# Patient Record
(demographics unavailable — no encounter records)

---

## 2025-05-27 NOTE — ASSESSMENT
[FreeTextEntry1] : Patient came in for Initial liver transplant evaluation. Pt met with members of the team: Transplant Surgeon , Hepatologist Dr Adams,Transplant Coordinator, , Dietician, Pharmacist,  and ASA. Liver Transplant Education provided via Power Point presentation, overview given of transplant evaluation process, risks/benefits of transplantation, live donor/ donor transplantation,PHS risk/DCD/HCV organ sources, Waitlist management, post transplant care/medication/clinic follow-up. Provided and reviewed educational packet.  All questions answered and team contact information provided. Medications reviewed and updated. Patient reviewed and signed: Informed consent for liver transplant evaluation, HIPPA, [employer], email and HIV form. Patient was advised to inform the transplant coordinator with any changes in health status.   HCV donor acceptance consent - Deferred at this time   Required testing/labs reviewed with pt.  Once said testing has been completed, Transplant Coordinator will present to the selection committee   Assessment and Plan:  Pt is a 69 yo F w AIH cirrhosis c/b pruritis (no jaundice appreciated), recently restarted (25) on Pred 30, MMF 1000mg bid after being off treatment for over a year, comorbid w DM, HNT, fibromyalgia, Sjogren's syn, SLE, hypothyroidism and carcinoid tumor s/p gastric wedge resection here for liver txp evaluation.  Pt endorses Pruritis over whole body for past 2 mos.  Non jaundiced. TB 1.4. Also endorses noticing blood on TP and Toilet bowl with BM (per pt, thought not to be related to hemorrhoids).  Pt cried when starting liver txp power point, said she feels overwhelmed.  Reassured pt this is for educational purposes, not for definitive transplant, she will be evaluated today only.  Pt consented to liver txp evaluation.  The following testing is needed:   Liver Evaluation Labs, including AI markers HCC screening, CTAP 25 (pt brought CD), no HCC.  DUE NOV.  Will get MRI given CT not triple phase Never smoker Cardiac, Risk Factors, Age, DM, HTN, will need DSE, CTCA v CCath, refer to cards, prefer to see Dr. Andre EGD,  - obtain report, repeat to be done by Dr. Pérez next month (reported hx blood in TB and TP) COLON, , obtain report, repeat to be done by Dr. Pérez next month (reported hx blood in TB and TP) Infectious Disease consult, refer to ID GYN - PAP/MAMMO, PAP 2024, has MAMMO referral fr her GYN, Dr Levy, [phone], needs updated PAP HEME?/Other, hemoncology notes, b12 deficiency SW recommendations RD recommendations Dental, not needed Donor option, none at this time

## 2025-05-27 NOTE — HISTORY OF PRESENT ILLNESS
[Autoimmune Hepatitis] : Autoimmune Hepatitis [Diabetes] : History of diabetes [Hypertension] : History of hypertension [Not Working] : Not working [90: Able to carry normal activity; minor signs or symptoms of disease.] : 90: Able to carry normal activity; minor signs or symptoms of disease.  [History of HCC] : No history of hepatocellular carcinoma [Previous Transplant] : No history of previous transplant [Previous Pancreas Islet Infusion] : No history of previous pancreas islet infusion [Coronary Artery Disease] : No history of coronary artery disease [Previous MI] : No history of previous MI [FreeTextEntry3] : retired 8 yrs ago,  at [employer] [TextBox_42] : Referred to Dr. Bear by Dr Pérez Referred for OLTx evaluation by Dr. Bear (given high risk features on liver pathology) PCP Dr. Amita Mohamud [phone]; [phone] Cardiologist Dr. Andre, [phone] Hemeoncologist Dr. Head   68F w PMHx HTN, DM, obesity (BMI 30.7), fibromyalgia, Sjogren's syndrome (not on treatment), SLE, hypothyroidism, b12 deficiency (rec'd b12 shots around 15 daily shots ), carcinoid tumor s/p gastric wedge resection (25 yrs ago) and biopsy-proven Cirrhosis likely d/t pt's longstanding Autoimmune Hepatitis (MARCOS +ASMA -), recently restarted on MMF and prednisone, referred by Dr. Bear and here for liver transplant evaluation. 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 Pérez- now off treatment at least 3-4 years d/t SE   Pt stopped AIH meds (prednisone) more than 1 yr ago d/t difficulty controlling BS and HTN. The AZA was stopped due to elevated liver enzymes.  Pt was not transitioned to any other immunosuppression Diagnosed with DM and Hypothyroidism 8 yrs ago per PCP, discovered after checkup for eye blurriness, frequent urination, started on Insulin/Metformin for DM and levothyroxine for hypothyroidism 8 yrs ago will be going for Endocrinologist consult this week, Dr. Antonio Car, [phone], referral from Dr. Bear Consult with Dr. Bear 25 at [facility], and was started on prednisone 30mg PO daily with MMF 1000mg BID, also SS Bactrim for PCP ppx Was also started on pantoprazole 40mg PO daily for stress ulcer prophylaxis and advised to take separately from thyroid medication Pt was referred to Endocrinology to establish care given hx elev bs on pred  SURG HX Gastric wedge resection, laparoscopic 25 yrs ago, Dr. Heath Hysteroscopy w dilation and curettage of uterus and surgical removal of polyp or leiomyoma 2 yrs ago Cataract surgery -R 12/10/24 and  L 25,  Dr. Yeh  SOCIAL HX TOBACCO -never ETOH - occasional wine, social, not for past month DRUG/ILLICITS - denies  OCCUPATION,- retired 8 yrs ago,  at [employer] , 1 son 37, son lives [place], has a 5 yr old daughter Lives in private house in [facility] COVID VAX -2 COVID Infection, 3 yrs ago  FHX No known liver cancer, liver cirrhosis in family Paternal Uncle, colon cancer, surgically removed, alive Paternal Aunts x 2 with Autoimmune Hepatitis, one alive, one  Father, DM, kidney disease, never on dialysis, thyroid cancer,  at age 93  Father, Paternal GM, 4 paternal cousins and their mothers with Thyroid cancer paternal aunts/uncles, cousins with DM  ALLERGIES Penicillin (Rash-itch) Erythromycin (Rash-itch) Citrus (Rash-itch) Chocolate (Rash-itch)  MEDICATION bactrim SS daily pantoprazole 40mg daily prednisone 10mg daily MMF 1000mg bid Avalide (irbesartan Hctz) 300 mg-12.5 mg tab daily diclofenac 1% topical gel: Apply topically to affected area 2 times a day x 30 days (fibromyalgia) ferosol 1x daily lantus  Pulmicort Flexhaler 90 mcg/inh inhalation powder: puff(s) inhaled, prn allergies levocetirizine for allergies prn  PT SELF-STOPPED THE BELOW MEDS: MetFORMIN 500mg bid (stopped a month ago by pt, was getting dizzy spills, nausea) metoprolol succinate 25 mg daily, stopped about 1 month ago sec to rapid HR)  LABS 2025 IGG 4089 Hg 14.0  ALT 56 AST 79  TB 1.4    SCr 0.9 Na 135 ALB 3.2  A1C 6.1  TPMT awaited **  STUDIES  CTAP w/wo IVC 25 FINDINGS Liver: Nodular contour compatible with cirrhosis. The liver parenchyma appears heterogeneous. No enhancing mass is demonstrated. An 8 mm hypodensity is noted in the left lobe, image 6:27 not present on the prior study and too small to categorize. A 5 mm hypodensity is unchanged possibly a cyst. Gallbladder: Cholelithiasis. Bile ducts: Unremarkable Kidneys/Ureters/Urinary Bladder: No evidence of hydronephrosis. The bladder is unremarkable. Spleen: Unremarkable although mildly prominent. Pancreas: Unremarkable Adrenal glands: Unremarkable Retroperitoneum: Small retroperitoneal lymph nodes are unchanged. Lymph nodes: No enlarged lymph nodes. Peritoneum: No ascites Bowel/Stomach :The bowel including the stomach is not optimally distended for complete evaluation however no obstruction is demonstrated. No dilated loops of bowel are demonstrated. Surgical sutures in the stomach is unchanged. Appendix: Not well visualized Pelvic Organs: The uterus is leiomyomatous. No pelvic fluid is seen. Musculoskeletal: Normal. Vascular Structures: No evidence of abdominal aortic aneurysm. Atherosclerotic aorta.  IMPRESSION: Very small hepatic hypodensity not seen on the prior study for which short-term follow-up is recommended with CT or MR abdomen only, IV contrast in approximately 3 months. Cirrhotic morphology. No additional significant interval changes demonstrated in comparison with 4/15/2024. Additional findings as above.  Abdl US  24 Hepatomegaly with heterogeneous echotexture and nodular contour compatible with cirrhosis.  EGD , reportedly normal Dr. Pérez to repeat in about a month (wait after liver bx)  COLONOSCOPY , reportedly benign polyps  Liver biopsy 2025 Comment The biopsy specimen shows distortion of underlying hepatic architecture by broad bands of [id number], nodular fibrosis consistent with cirrhosis (trichrome, reticulin). The portal tracts show a mild chronic inflammatory infiltrate. The lobules also show mild chronic inflammation with areas of spotty necrosis and scattered acidophil bodies. The lobules also contain mild steatosis but no evidence of steatohepatitis. There is no cholestasis or stainable hepatocellular iron.  The overall findings are those of cirrhosis, likely secondary to the patient's known longstanding autoimmune hepatitis.  INTERVAL HX feels normal, a little stressed has not slept well in 3 weeks from ? anxiety denies abdl pain, NVD, f/c endorses exertional SOB, CP endorses pruritis all over body since past 3 months, reports rashes on upr chest, bilat underarms 2 mos, more since 3 weeks ago when started meds appetite fair BM 2x/day, + hemorrhoidal blood, and 1x not r/t hemorrhoids on TP and in toilet (2 weeks ago) does not exercise pt said she slipped and fell in bathtub 1 yr ago, work up in OSH negative per pt.

## 2025-05-27 NOTE — HISTORY OF PRESENT ILLNESS
[TextEntry] : GAEL BERNAL is a 68 year old female with PMH biopsy-proven AIH cirrhosis, medication nonadherence, limited health literacy, class I obesity (BMI 30.7), HTN, DM2 (A1c 6.1% in 5/2025), SLE (not on medication), RA (not on medication), Sjogren's syndrome, fibromyalgia, gout, hypothyroidism (not on medication), who presents for OLT evaluation. Referred by Dr. Abhijit Bear from [facility]. Blood type: ? MELD 3.0 = *** (based on labs from ***) PCP: Amita Mohamud Hepatologist: Dr. Abhijit Bear ([facility]) GI: Dr. Ellie Pérez ([facility]) Endo: Antonio Car [phone] ([facility]) Rheum: BECK FISCHER MD ([facility]) Pharmacy: ***   PRIOR DATA: -4/16/25: WBC 5.7, Hgb 13.2 (MCV 95.7), Plt 115. AST/ALT 75/54, DB/TB 0.3/0.8, , Alb 3.2, TP 9.2. INR 1.1.  aFP 7.4. AMA pending, ASMA pending, LKM antibody negative, IgG quant 3644 (high), IgM quant 467 (high). Serum Fe 106, ferritin 154.7.  HBsAg pending, HBsAb negative. HCV PCR ND. -5/2/25: WBC 5.3, Hgb 14.0 (MCV 95.4), Plt 120. Na 135, K 3.6, Cr 0.9, glucose 163. Mg , Phos . AST/ALT 79/56, TB 1.4, , Alb 3.2, TP 9.3.  .  INR not done.  IgG quant 4089 (high). A1c 6.1%.  TPMT pending. CT A/P w/wo IV contrast ([facility]) => nodular liver contour compatible with cirrhosis.  The liver parenchyma appears heterogeneous.  No enhancing mass is demonstrated.  An 8 mm hypodensity is noted in the left lobe, not present on the prior study (4/15/2024) and too small to categorize.  A 5 mm hypodensity is unchanged possibly a cyst.  Cholelithiasis.  No biliary ductal dilatation.  Mildly prominent spleen.  Pancreas unremarkable.  Small retroperitoneal lymph nodes are unchanged, no enlarged lymph nodes.  No ascites.  The uterus is leiomyomatous. IMPRESSION: Very small hepatic hypodensity mass seen on the prior study for which short-term follow-up is recommended with CT or MR abdomen only, IV contrast in approximately 3 months.  Cirrhotic morphology. No additional significant interval changes demonstrated in comparison with 4/15/2024.  Additional findings as above. -5/7/2025: Seen by Dr. Bear (hepatology) at [facility].  Started on prednisone 30 mg daily, MMF 1000 mg twice daily, Bactrim SS daily for PCP prophylaxis, pantoprazole 40 mg daily for GI prophylaxis.  Patient was also referred to endocrinology for management of DM2.  Today 5/26/2025: Liver meds: Prednisone 30 mg daily, MMF 1000 mg twice daily.  Bactrim SS daily (PCP prophylaxis), pantoprazole 40 mg daily (GI prophylaxis). Other meds: Irbesartan-Hydrochlorothiazide 300 mg-12.5 mg daily.  Insulin Lantus.  Diclofenac gel.  Levocetirizine as needed for allergies.  Budesonide inhaler as needed for allergies.  Ferrous sulfate 325 mg daily. STOPPED: metoprolol succinate 25 mg daily (self DC'd in 4/2025 2/2 tachycardia).  Metformin 500 mg twice daily (self DC'd in 4/2025 2/2 dizziness, nausea). Pt reports she has NEVER taken any thyroid med   Patient came alone to today's OLT evaluation appointment. Denies f/c, cp, sob, cough, abd pain, n/v/d/c, hematochezia/melena, dysuria/hematuria.   Liver Hx: Age of diagnosis: diagnosed with AIH 15 yrs ago at employee health testing. Prior to 4/2025, she had never liver biopsy previously. [facility]"" dx pt with AIH 21 years ago (2004) based on blood tests alone. There was no liver bx. Pt reports that she took AZA in 2020, took it for 1 week, pt self-dc'ed the med, "I just didn't want to take it, I don't even remember the side effect I was getting."  Previously was on prednisone via her rheumatologist (following for past 10 years -- Dr. Fischer, [facility]) in 2019 for SLE, took it for 1-2 weeks, side effects of palpitations, hyperglycemia, so pt self-dc'ed the med. Dr. Fischer knew pt self-dc'ed the prednisone. She also recalls being on prednisone via Dr. Pérez ~10 years ago. She does not recall ever taking Plaquenil. Pt reports she has not taken anything for SLE since 2019. SLE sx: diffuse rash, neck LAD. Last saw Dr. Fischer in 3/2025, follows up q3mo. During flares, she gets cortisone shot in the joints.  Prior liver biopsy:  - 4/22/2025: Liver biopsy ([facility]) => biopsy specimen shows distortion of underlying hepatic architecture by broad bands of portal to portal, nodular fibrosis consistent with cirrhosis (trichrome, reticulin).  The portal tracts show a mild chronic inflammatory infiltrate.  The lobules also show mild chronic inflammation with areas of spotty necrosis and scattered acidophil bodies.  The lobules also contain mild steatosis but no evidence of steatohepatitis.  There is no elevated or stainable hepatocellular iron. The overall findings are those of cirrhosis, likely secondary to the patient's known longstanding autoimmune hepatitis.  Prior HE: never Prior GI bleed: Endorses prior episode of in Jan 2025 (hematochezia). Was recommended COL. However, due to liver bx in 4/2025, Dr. Pérez wanted to wait 1 month. No further episodes since then. NO episodes of melena, hematemesis. Weight hx: Peak lifetime weight 186# in 2020. Was thin as a child, thin as a teen.   SOCIAL HX -Tobacco: never -Alcohol: Social - 2 glasses of wine/week; last drank in 3/2025, stopped due to her liver disease. Denies hx of heavy alcohol use. -Illicit drugs: never.  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  -Education: associate's degree in college, degree is in SW -Born in: [place]; ancestry is [place] -Lives with . Son - 36yo, lives in [place]. -Support: Unclear at this time.  Patient reports that her  has had MI x 3.  Patient also states that her son has a daughter and may not be able to assist her posttransplant.   SURGICAL HX Uterine fibroid removal in 2024 Gastric wedge resection for carcinoid tumor >15 years ago   FAM HX Denies family hx of cirrhosis, autoimmune disease, IBD, CRC or other primary GI malignancy.  Father - fatty liver. Pat uncle - liver cancer Pat aunt - liver cancer Pat cousin x2 - fatty liver Son - Sjogren's    ALLERGIES Erythromycin-rash Penicillin-rash   STUDIES -EGD: EGD 2023, reportedly normal (we do not have the report). Dr. Pérez to repeat in about a month (wait after liver bx) -COL: 2023, reportedly benign polyps (we do not have the report).

## 2025-05-27 NOTE — ASSESSMENT
[FreeTextEntry1] : GAEL BERNAL is a 68 year old female with PMH biopsy-proven AIH cirrhosis, medication nonadherence, limited health literacy, class I obesity (BMI 30.7), HTN, DM2 (A1c 6.1% in 2025), SLE (not on medication), RA (not on medication), Sjogren's syndrome, fibromyalgia, gout, hypothyroidism (not on medication), who presents for OLT evaluation. Referred by Dr. Abhijit Bear from [facility].  Patient presented alone to today's OLT evaluation appointment.  She was referred for OLT evaluation by Dr. Bear at [facility] due to liver biopsy in 2025 showing spotty necrosis.  Patient's cirrhosis appears to be compensated at this time. She has a longstanding history of medication nonadherence dating back many years, and describes a history of self-dc'ing multiple medications for her autoimmune conditions, DM2 due to various side effects. She appears to have limited health literacy and was not able to describe what autoimmune disease, AIH, or cirrhosis are. In addition, it is unclear what pt's post-transplant support would be; she states that her  has had MI x3 (and thus was not able to accompany her to today's appointment), and her son (lives in [place]) has a daughter and may not necessarily be able to care for her post transplant. Will proceed with OLT evaluation.  Appreciate social work input in identifying appropriate posttransplant support for the patient.  Due to patient's limited health literacy and significant history of medication nonadherence, ongoing counseling/reinforcement will be provided at every visit.  #Compensated AIH cirrhosis: - EV screening: Patient reports that she follows with GI on [facility], and is planned for an upcoming EGD/colonoscopy.  Will request procedure reports once they are completed. - HCC screening: CT A/P ([facility]) in 2025 does not appear to have been a triple phase study.  Will obtain MR abdomen with and without contrast. Pt was counseled on the importance of obtaining abd imaging, AFP q6mo for HCC screening. - Counseled pt on natural hx of AIH cirrhosis and its complications. - Counseled pt to avoid hepatotoxins, alcohol use, herbal supplements (patient reports that she had recently purchased a "liver detox" supplement--I advised her to not take this). Limit acetaminophen to 2g/day. - Management of AIH as below. - I spent a significant amount of time counseling patient on the pathophysiology of autoimmune disease, and the importance of medication adherence for all of her medical conditions.  #AIH: - Patient was recently started on prednisone 30 mg daily, MMF 1000 mg twice daily by Dr. Bear at [facility] in 2025.  Patient reports that she has been adherent to this regimen. - Check AIH serologies, IgG quant, IgM quant.  #OLT Eval (as per NP Tyrese's note on 2025): Liver Evaluation Labs, including AI markers HCC screening, CTAP 25 (pt brought CD), no HCC. DUE NOV. Will get MRI given CT not triple phase Never smoker Cardiac, Risk Factors, Age, DM, HTN, will need DSE, CTCA v CCath, refer to cards, prefer to see Dr. Andre EGD,  - obtain report, repeat to be done by Dr. Pérez next month (reported hx blood in TB and TP) COLON, , obtain report, repeat to be done by Dr. Pérez next month (reported hx blood in TB and TP) Infectious Disease consult, refer to ID GYN - PAP/MAMMO, PAP 2024, has MAMMO referral fr her GYN, Dr Levy, [phone], needs updated PAP HEME?/Other, heme/onc notes, b12 deficiency SW recommendations RD recommendations Dental, not needed Donor option, none at this time.   Labs today. Patient reports that she has hepatology follow-up appointment scheduled with Dr. Bear at [facility] in 2 weeks. RTC in 6 weeks.   Patient was explained alternatives, benefits and risk of liver transplantation, including but not limited to infection, bleeding, hepatic artery or portal vein thrombosis, primary dysfunction or primary non-function of the liver allograft, cardiopulmonary arrest, intra-operative death and other surgical, medical and psychosocial risks as outlined in the evaluation consent form. Patient understands these risks and is willing to proceed with liver transplantation.   Patient was also explained the need to remain on lifelong anti-rejection medications.  We discussed the risks and side effects of immunosuppressive medications including, but not limited to infection, cancer, weight gain, new onset or worsening of diabetes or hypertension in a temporary or permanent state, kidney dysfunction, water retention, back pain, constipation, diarrhea, dizziness, headache, joint pain, loss of appetite, nausea, stomach pain or upset, trouble sleeping, vomiting, and mental or mood changes.  An overview of the follow-up protocol was reviewed including outpatient visits, blood tests and the potential for hospital readmission. The patient understands these risks and is willing to proceed with liver transplantation.   We also discussed the available donor organ pool. We discussed the assessment of the  donor including age, cause of death, cardiac arrest, electrolyte abnormalities, course and length of hospital stay, use of vasopressors, hepatitis and HIV testing. We reviewed organ donor risk factors that could affect the success of the graft or the health of the patient, including, but not limited to, the donor's history, condition or age of the organs used, or the patient's potential risk of carola the human immunodeficiency virus and other infectious diseases if the disease cannot be detected in an infected donor.  We discussed and defined the option of an extended criteria for cadaveric donors (Hepatitis B core Ab positive donor, Hepatitis C Ab positive donor, steatosis, older donors, split livers and DCD donors) and early and late outcomes of graft survival after transplantation.    The options of  donor liver transplantation vs live donor liver transplantation were discussed. Differences between donation after cardiac death (DCD) compared to donation after brain death (DBD) liver transplantation were also fully disclosed and included lower graft survival rates, the increased incidence of hepatic artery stenosis, bile duct injury, ischemic cholangiopathy and increased re transplant rates seen in recipients of DCD donor livers.    The [employer] (PHS) has set risk criteria for the transmission of viruses from donors to recipients.  A donor considered to have PHS risk criteria for a preventable viral infection based on their history which may suggest socially increased risk behaviors, was discussed. The patient is aware that if a PHS risk criteria donor is offered to the candidate, the transplant team will discuss the specifics to assist with making an informed decision. We discussed our post-transplant protocol for serology testing.   The patient was made aware that it is against the law to be paid or to pay to donate an organ.  If any money was given or will be given in exchange for receiving an organ, the patient may be subject to criminal prosecution, and any insurance coverage may no longer apply and patient may become personally responsible for all the health care costs associated with the donation, and private health information will be available to law enforcement agencies.   We explained that we store vessels for subsequent later use in transplants.  Again, we discussed the extensive testing done on  donors prior to donation, however despite an extensive evaluation on the donor, there is potential risk a recipient may contract infectious diseases (HIV or Hepatitis) or cancer if they cannot be detected in the donor. In the cases where PHS risk criteria donor vessels are used, we test the recipient per protocol between 1-2 months post-transplant for any potential infectious disease transmission. The patient understands that there is the potential of use of  donor vessels and PHS risk criteria donor vessels. The patient understands these risks and is willing to receive potentially PHS risk criteria donor vessels.   We also discussed the MELD allocation system in depth and the one-year observed and expected patient and graft survival rates according to data from the Scientific Registry for Transplant Recipients. These center specific outcomes were provided in comparison to the national one-year averages as described in the evaluation consent forms.   Prior to signing consent, patient was given an opportunity to ask questions.  After all concerns were addressed, informed consent was signed. Patient is aware that they may withdraw their consent for transplantation at any time.  Further, the patient is aware of the right to refuse an organ offer without penalty at any time. Pt has acknowledged understanding of PHS risk criteria donor.

## 2025-05-27 NOTE — HISTORY OF PRESENT ILLNESS
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

## 2025-05-27 NOTE — PHYSICAL EXAM
[Well Nourished] : well nourished [Healthy Appearing] : healthy appearing [No Acute Distress] : no acute distress [No Respiratory Distress] : no respiratory distress [No Accessory Muscle Use] : no accessory muscle use [No Edema] : no edema [Non Tender] : non-tender [Normal Gait] : normal gait [No Focal Deficits] : no focal deficits [Scleral Icterus] : no scleral icterus [Jaundice] : no jaundice [Asterixis] : no asterixis [Depression] : no depression [Suicidal Ideation] : no suicidal ideation [de-identified] : Central adiposity.  Soft, nondistended. [de-identified] : AOX 3

## 2025-05-27 NOTE — ASSESSMENT
[FreeTextEntry1] : 69yo F AIH cirrhosis, compensated with low MELD 12 scan reviewed - no contraindication to liver transplantation seems to be well-compensated and may be too early/well for transplant listing